# Patient Record
Sex: FEMALE | URBAN - METROPOLITAN AREA
[De-identification: names, ages, dates, MRNs, and addresses within clinical notes are randomized per-mention and may not be internally consistent; named-entity substitution may affect disease eponyms.]

---

## 2022-04-19 ENCOUNTER — APPOINTMENT (RX ONLY)
Dept: URBAN - METROPOLITAN AREA CLINIC 55 | Facility: CLINIC | Age: 82
Setting detail: DERMATOLOGY
End: 2022-04-19

## 2022-04-19 DIAGNOSIS — Z41.9 ENCOUNTER FOR PROCEDURE FOR PURPOSES OTHER THAN REMEDYING HEALTH STATE, UNSPECIFIED: ICD-10-CM

## 2022-04-19 PROCEDURE — ? BOTOX

## 2022-04-19 PROCEDURE — ? COSMETIC CONSULTATION: GENERAL

## 2022-04-19 NOTE — PROCEDURE: BOTOX
Left Periorbital Skin Units: 0
Show Additional Area 5: Yes
Show Ucl Units: No
Periorbital Skin Units: 8
Additional Area 5 Location: Chin
Additional Area 2 Location: Lip
Additional Area 4 Location: gummy smile
Lot #: [id number]
Consent: Written consent obtained. Risks include but not limited to lid/brow ptosis, bruising, swelling, diplopia, temporary effect, incomplete chemical denervation.
Post-Care Instructions: Patient instructed to not lie down for 4 hours and limit physical activity for 24 hours.
Detail Level: Detailed
Glabellar Complex Units: 20
Additional Area 3 Location: Nasalis
Expiration Date (Month Year): 05/31/24
Additional Area 1 Location: under eye “jelly roll”
Dilution (U/0.1 Cc): 4

## 2022-05-03 ENCOUNTER — APPOINTMENT (RX ONLY)
Dept: URBAN - METROPOLITAN AREA CLINIC 55 | Facility: CLINIC | Age: 82
Setting detail: DERMATOLOGY
End: 2022-05-03

## 2022-05-03 DIAGNOSIS — Z41.9 ENCOUNTER FOR PROCEDURE FOR PURPOSES OTHER THAN REMEDYING HEALTH STATE, UNSPECIFIED: ICD-10-CM

## 2022-05-03 PROCEDURE — ? COSMETIC FOLLOW-UP

## 2022-05-03 NOTE — PROCEDURE: COSMETIC FOLLOW-UP
Comments (Free Text): Patient back for 2 week follow-up. Did not add any dosing today. Patient had questions about filler and would like to purchase filler from the sale. Discussed with patient she would need a consult prior to scheduling an appointment. RN briefly discussed consent/pre/post information. Discussed with patient that Dr. Da Silva will recommend how many syringes she needs but for the sale RN recommended starting with 4 syringes.
Detail Level: Zone

## 2022-06-14 ENCOUNTER — APPOINTMENT (RX ONLY)
Dept: URBAN - METROPOLITAN AREA CLINIC 55 | Facility: CLINIC | Age: 82
Setting detail: DERMATOLOGY
End: 2022-06-14

## 2022-06-14 DIAGNOSIS — Z41.9 ENCOUNTER FOR PROCEDURE FOR PURPOSES OTHER THAN REMEDYING HEALTH STATE, UNSPECIFIED: ICD-10-CM

## 2022-06-14 PROCEDURE — ? COSMETIC CONSULTATION: GENERAL

## 2022-08-02 ENCOUNTER — APPOINTMENT (RX ONLY)
Dept: URBAN - METROPOLITAN AREA CLINIC 55 | Facility: CLINIC | Age: 82
Setting detail: DERMATOLOGY
End: 2022-08-02

## 2022-08-02 DIAGNOSIS — Z41.9 ENCOUNTER FOR PROCEDURE FOR PURPOSES OTHER THAN REMEDYING HEALTH STATE, UNSPECIFIED: ICD-10-CM

## 2022-08-02 PROCEDURE — ? INVENTORY

## 2022-08-02 PROCEDURE — ? FILLERS

## 2022-08-02 NOTE — PROCEDURE: FILLERS
Number Of Syringes (Required For Inventory): 1
Tear Troughs Filler Volume In Cc: 0
Map Statment: See Attach Map for Complete Details
Include Cannula Information In Note?: No
Anesthesia Type: 1% lidocaine with epinephrine
Anesthesia Volume In Cc: 0.5
Include Cannula Information In Note?: Yes
Inventory Information: This plan will send filler information to inventory based on the fillers you select. Multiple fillers can be sent but you must ensure you select the appropriate fillers in the inventory tab.
Detail Level: Detailed
Include Cannula Size?: 27G
Consent was obtained.
Post-Care Instructions: Aftercare instructions were provided to the patient.
Show Inventory Tab: Hide
Filler: RHA 3
Filler: RHA 4

## 2024-08-15 ENCOUNTER — APPOINTMENT (RX ONLY)
Dept: URBAN - METROPOLITAN AREA CLINIC 55 | Facility: CLINIC | Age: 84
Setting detail: DERMATOLOGY
End: 2024-08-15

## 2024-08-15 DIAGNOSIS — Z41.9 ENCOUNTER FOR PROCEDURE FOR PURPOSES OTHER THAN REMEDYING HEALTH STATE, UNSPECIFIED: ICD-10-CM

## 2024-08-15 PROCEDURE — ? INVENTORY

## 2024-08-15 PROCEDURE — ? BOTOX

## 2024-08-15 NOTE — PROCEDURE: BOTOX
Show Additional Area 3: Yes
Inferior Lateral Orbicularis Oculi Units: 0
Show Right And Left Periorbital Units: No
Dilution (U/0.1 Cc): 4
Show Inventory Tab: Hide
Consent obtained. Risks include but not limited to lid/brow ptosis, bruising, swelling, diplopia, temporary effect, incomplete chemical denervation.
Periorbital Skin Units: 8
Additional Area 3 Location: gummy smile
Post-Care Instructions: Patient instructed to not lie down for 4 hours and limit physical activity for 24 hours.
Incrementing Botox Units: By 0.5 Units
Additional Area 2 Location: Nasalis
Detail Level: Detailed
Glabellar Complex Units: 20
Additional Area 1 Location: Lip